# Patient Record
Sex: FEMALE | Race: WHITE | NOT HISPANIC OR LATINO | URBAN - METROPOLITAN AREA
[De-identification: names, ages, dates, MRNs, and addresses within clinical notes are randomized per-mention and may not be internally consistent; named-entity substitution may affect disease eponyms.]

---
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
## 2020-01-10 ENCOUNTER — MOBILE ON CALL (OUTPATIENT)
Age: 20
End: 2020-01-10

## 2020-01-13 ENCOUNTER — OUTPATIENT (OUTPATIENT)
Dept: OUTPATIENT SERVICES | Facility: HOSPITAL | Age: 20
LOS: 1 days | End: 2020-01-13
Payer: COMMERCIAL

## 2020-01-13 ENCOUNTER — APPOINTMENT (OUTPATIENT)
Dept: ULTRASOUND IMAGING | Facility: CLINIC | Age: 20
End: 2020-01-13
Payer: COMMERCIAL

## 2020-01-13 ENCOUNTER — RESULT REVIEW (OUTPATIENT)
Age: 20
End: 2020-01-13

## 2020-01-13 DIAGNOSIS — R92.8 OTHER ABNORMAL AND INCONCLUSIVE FINDINGS ON DIAGNOSTIC IMAGING OF BREAST: ICD-10-CM

## 2020-01-13 DIAGNOSIS — Z98.890 OTHER SPECIFIED POSTPROCEDURAL STATES: Chronic | ICD-10-CM

## 2020-01-13 DIAGNOSIS — Z00.8 ENCOUNTER FOR OTHER GENERAL EXAMINATION: ICD-10-CM

## 2020-01-13 PROCEDURE — 19084 BX BREAST ADD LESION US IMAG: CPT | Mod: RT

## 2020-01-13 PROCEDURE — 88305 TISSUE EXAM BY PATHOLOGIST: CPT

## 2020-01-13 PROCEDURE — 88305 TISSUE EXAM BY PATHOLOGIST: CPT | Mod: 26

## 2020-01-13 PROCEDURE — 19083 BX BREAST 1ST LESION US IMAG: CPT | Mod: LT

## 2020-01-13 PROCEDURE — 19083 BX BREAST 1ST LESION US IMAG: CPT

## 2020-01-22 ENCOUNTER — APPOINTMENT (OUTPATIENT)
Dept: CARDIOLOGY | Facility: CLINIC | Age: 20
End: 2020-01-22

## 2020-02-25 ENCOUNTER — APPOINTMENT (OUTPATIENT)
Dept: SURGERY | Facility: CLINIC | Age: 20
End: 2020-02-25
Payer: COMMERCIAL

## 2020-02-25 VITALS
DIASTOLIC BLOOD PRESSURE: 70 MMHG | SYSTOLIC BLOOD PRESSURE: 109 MMHG | WEIGHT: 166 LBS | TEMPERATURE: 98.3 F | BODY MASS INDEX: 24.59 KG/M2 | HEIGHT: 69 IN

## 2020-02-25 PROCEDURE — 99214 OFFICE O/P EST MOD 30 MIN: CPT

## 2020-02-26 NOTE — HISTORY OF PRESENT ILLNESS
[FreeTextEntry1] : I have the pleasure of seeing Sophie Hanson in the office for a followup visit to discuss bilateral breast core biopsies.  She presents with her grandmother.\par \par She is a jesus manuel 20 yo healthy female.  She felt a palpable right breast lump in mid July 2018 and saw her physician who recommended an ultrasound which then led to US guide core biopsy demonstrating a 2.8 cm Fibroadenoma.  She then felt the mass enlarged in December 2018 and underwent ultrasound.  Ultrasound demonstrated the fibroadenoma as 4.7 x 3.3 cm in size which is an increase in size from August 2018 US. She is s/p right breast excisional biopsy 1/9/2019 secondary 4.7 cm right breast biopsied as fibroadenoma without atypia. \par \par She started menses at age 15.\par She denies personal history of malignancy.\par Family history is significant for Mgreat grandmother diagnosed with breast cancer at age >50 and maternal great aunt diagnosed with breast cancer at age 80, and grandfather diagnosed with lung cancer at age 72.\par \par Imaging:  [facility]\par 6/10/2019  Breast ultrasound left complete:  Left breast palpable lump has the appearance of a fibroadenoma.  Lesion is probably benign.  Recommendation A six month left breast ultrasound follow up is advised to document stability.  US guided core biopsy can be performed in out is clinically indicated.  BIRADS 3 Probably benign.\par \par 12/27/2019  Bilateral breast ultrasound\par Impression: Marked increased in size in previously palpable hypoechoic mass 9:00 left breast 10 cm from the nipple.  Ultrasound core biopsy is warranted at this time in further evaluation.  If benign, the remainder of findings can be followed in 6 months time to document stability.  [id number]\par \par 1/13/2020  Surigal pathology\par 1.  Breast left 9:00 10 cm from nipple, ultrasound guided core biopsy:  Fibroadenoma\par 2.  Breast right 10:00 7 cm from nipple, ultrasound guided core biopsy;  Fibroadenoma.\par \par Pathology:\par 1/9/2019  Final surgical pathology\par 1.  Mass, right breast, wide excision:  Fibroadenoma (5.5cm ).  No atypia or malignancy seen.  Lesion focally extends to inked peripheral margin (anterior, medial inferior).\par 2.  Additional tissue, right breast, excision:  Mild fibrocystic changes (Fibrous with mild pseudo angiomatous stromal hyperplasia, mild columnar cell change).  Negative for atypia or malignancy.\par \par We reviewed and discussed biopsy results.  She understands both the right and left breast biopsy demonstrated fibroadenomas.  The right fibroadenoma measures 4 cm and the left breast fibroadenoma measures 1.3 cm.  We discussed surgical management and she will proceed with left breast kasey  localized lumpectomy and right breast kasey  excisional biopsy.

## 2020-02-26 NOTE — ASSESSMENT
[FreeTextEntry1] : 18 yo premenopausal female presents for bilateral breast biopsies demonstrating fibroadenomas.  She has a history of right breast 12:30 kasey  excisional biopsy of a 5.5 cm fibroadenoma.  Right breast fibroadenoma on most recent ultrasound measures 1.3 cm and left breast fibroadenoma measures 4 cm.  Plan for left breast kasey  localized lumpectomy and right breast kasey  excisional biopsy.\par

## 2020-02-26 NOTE — PHYSICAL EXAM
[Normocephalic] : normocephalic [Atraumatic] : atraumatic [EOMI] : extra ocular movement intact [PERRL] : pupils equal, round and reactive to light [Sclera nonicteric] : sclera nonicteric [No Supraclavicular Adenopathy] : no supraclavicular adenopathy [Supple] : supple [Examined in the supine and seated position] : examined in the supine and seated position [No dominant masses] : no dominant masses in right breast  [No dominant masses] : no dominant masses left breast [No Nipple Discharge] : no right nipple discharge [No Nipple Retraction] : no left nipple retraction [No Axillary Lymphadenopathy] : no left axillary lymphadenopathy [No Edema] : no edema [No Rashes] : no rashes [No Ulceration] : no ulceration [Breast Nipple Inversion] : nipples not inverted [Breast Nipple Retraction] : nipples not retracted [Breast Nipple Flattening] : nipples not flattened [Breast Nipple Fissures] : nipples not fissured [Breast Abnormal Lactation (Galactorrhea)] : no galactorrhea [Breast Abnormal Secretion Bloody Fluid] : no bloody discharge [Breast Abnormal Secretion Serous Fluid] : no serous discharge [Breast Abnormal Secretion Opalescent Fluid] : no milky discharge [de-identified] : No supraclavicular or axillary adenopathy. No dominant masses, normal to palpation. Everted nipple without discharge. No skin changes.\par \par \par \par  [de-identified] : No supraclavicular or axillary adenopathy. 4 cm mass in the left 8-9:00 position which is freely mobile. Everted nipple without discharge. No skin changes.

## 2020-04-08 PROBLEM — R92.8 ABNORMAL FINDING ON BREAST IMAGING: Status: ACTIVE | Noted: 2019-12-16

## 2020-04-08 NOTE — HISTORY OF PRESENT ILLNESS
[FreeTextEntry1] : I have the pleasure of seeing Sophie Hanson in the office for a follow up visit s/p right breast excisional biopsy 1/9/2019 secondary 4.7 cm right breast biopsied as fibroadenoma without atypia. \par \par She is a jesus manuel 19 yo healthy female.  She felt a palpable right breast lump in mid July and saw her physician who recommended an ultrasound which then led to US guide core biopsy demonstrating a 2.8 cm Fibroadenoma.  She then felt the mass enlarged in December and underwent ultrasound.  Ultrasound demonstrated the fibroadenoma as 4.7 x 3.3 cm in size which is an increase in size from August 2018 US. \par \par She started menses at age 15.\par She denies personal history of malignancy.\par Family history is significant for Mgreat grandmother diagnosed with breast cancer at age >50 and maternal great aunt diagnosed with breast cancer at age 80, and grandfather diagnosed with lung cancer at age 72.\par \par Pathology:\par 1/9/2019  Final surgical pathology\par 1.  Mass, right breast, wide excision:  Fibroadenoma (5.5cm ).  No atypia or malignancy seen.  Lesion focally extends to inked peripheral margin (anterior, medial inferior).\par 2.  Additional tissue, right breast, excision:  Mild fibrocystic changes (Fibrous with mild pseudo angiomatous stromal hyperplasia, mild columnar cell change).  Negative for atypia or malignancy.\par \par We reviewed and discussed clinical breast exam.  She reports left breast tenderness for the past 1-2 months.  There is a palpable 2 cm nodule in the left 9:00 area , which is highly suspicious for a fibroadenoma.  Recommendation for left breast ultrasound at this time and will discuss further testing if needed depending on ultrasound results.   All questions answered.

## 2020-04-08 NOTE — PHYSICAL EXAM
[Normocephalic] : normocephalic [Atraumatic] : atraumatic [EOMI] : extra ocular movement intact [PERRL] : pupils equal, round and reactive to light [Sclera nonicteric] : sclera nonicteric [Supple] : supple [No Supraclavicular Adenopathy] : no supraclavicular adenopathy [Examined in the supine and seated position] : examined in the supine and seated position [No dominant masses] : no dominant masses in right breast  [No dominant masses] : no dominant masses left breast [No Nipple Retraction] : no left nipple retraction [No Nipple Discharge] : no left nipple discharge [Breast Nipple Inversion] : nipples not inverted [Breast Nipple Retraction] : nipples not retracted [Breast Nipple Flattening] : nipples not flattened [Breast Nipple Fissures] : nipples not fissured [Breast Abnormal Lactation (Galactorrhea)] : no galactorrhea [Breast Abnormal Secretion Bloody Fluid] : no bloody discharge [Breast Abnormal Secretion Serous Fluid] : no serous discharge [Breast Abnormal Secretion Opalescent Fluid] : no milky discharge [No Axillary Lymphadenopathy] : no left axillary lymphadenopathy [No Edema] : no edema [No Rashes] : no rashes [No Ulceration] : no ulceration [de-identified] : No supraclavicular or axillary adenopathy. No dominant masses, normal to palpation. Everted nipple without discharge. No skin changes.\par \par  [de-identified] : No supraclavicular or axillary adenopathy. 2 cm nodule left 9:00. Everted nipple without discharge. No skin changes.

## 2020-04-08 NOTE — ASSESSMENT
[FreeTextEntry1] : 18 yo premenopausal female s/p right breast excisional biopsy for a 5.5 cm biopsied proven Fibroadenoma in the right breast 12:30.   She reports left breast tenderness for the past 1-2 months.  There is a palpable 2 cm nodule in the left 9:00 area , which is highly suspicious for a fibroadenoma.  Recommendation for left breast ultrasound at this time and will discuss further testing if needed depending on ultrasound results.\par 1.  Left breast ultrasound now and will call with results for further recommendation depending on ultrasound results.\par

## 2020-06-03 ENCOUNTER — NON-APPOINTMENT (OUTPATIENT)
Age: 20
End: 2020-06-03

## 2020-06-03 ENCOUNTER — APPOINTMENT (OUTPATIENT)
Dept: CARDIOLOGY | Facility: CLINIC | Age: 20
End: 2020-06-03
Payer: COMMERCIAL

## 2020-06-03 VITALS
OXYGEN SATURATION: 98 % | DIASTOLIC BLOOD PRESSURE: 69 MMHG | HEART RATE: 96 BPM | SYSTOLIC BLOOD PRESSURE: 112 MMHG | WEIGHT: 156 LBS

## 2020-06-03 VITALS — SYSTOLIC BLOOD PRESSURE: 105 MMHG | DIASTOLIC BLOOD PRESSURE: 71 MMHG

## 2020-06-03 PROCEDURE — 99242 OFF/OP CONSLTJ NEW/EST SF 20: CPT | Mod: 25

## 2020-06-03 PROCEDURE — 93000 ELECTROCARDIOGRAM COMPLETE: CPT

## 2020-06-03 RX ORDER — NITROFURANTOIN (MONOHYDRATE/MACROCRYSTALS) 25; 75 MG/1; MG/1
100 CAPSULE ORAL
Qty: 14 | Refills: 0 | Status: DISCONTINUED | COMMUNITY
Start: 2019-11-09 | End: 2020-06-03

## 2020-06-03 RX ORDER — FEXOFENADINE HCL 60 MG
CAPSULE ORAL
Refills: 0 | Status: ACTIVE | COMMUNITY

## 2020-06-03 NOTE — ASSESSMENT
[FreeTextEntry1] : ECG performed today at the office revealed a NSR 88x' with normal AQRS, WY, QRS and QTc. Inverted T wave in the inferior leads. \par

## 2020-06-03 NOTE — REASON FOR VISIT
[Initial Evaluation] : an initial evaluation of [Family Member] : family member [FreeTextEntry1] : Eval for family Hx of HCM

## 2020-06-03 NOTE — DISCUSSION/SUMMARY
[FreeTextEntry1] : Ms. BRIDGETT LOPEZ is a 19 year female with a strong family history of HCM (mother  of HCM/CHF). being assessed xiomara the first time to R/O the presence of HCM (as well as her twin sister). She is completely asymptomatic, but has palpitations that she attributes to anxiety and had 4 syncopal events during competitive racing. Furthermore, the ECG has T wave inversion in the inferior leads.\par At the present time she is completely asymptomatic.\par Will not start anxiety meds yet\par We will perform an echocardiogram to assess for LVH, left ventricular and valvular function.\par Will perform a Holter 48 hours to R/O arrhythmia\par Routine follow up in 1 months\par

## 2020-06-03 NOTE — PHYSICAL EXAM
[General Appearance - Well Developed] : well developed [Normal Appearance] : normal appearance [Well Groomed] : well groomed [General Appearance - Well Nourished] : well nourished [General Appearance - In No Acute Distress] : no acute distress [No Deformities] : no deformities [Normal Conjunctiva] : the conjunctiva exhibited no abnormalities [Normal Jugular Venous V Waves Present] : normal jugular venous V waves present [Heart Rate And Rhythm] : heart rate and rhythm were normal [Heart Sounds] : normal S1 and S2 [Murmurs] : no murmurs present [Arterial Pulses Normal] : the arterial pulses were normal [Edema] : no peripheral edema present [Veins - Varicosity Changes] : no varicosital changes were noted in the lower extremities [Respiration, Rhythm And Depth] : normal respiratory rhythm and effort [Exaggerated Use Of Accessory Muscles For Inspiration] : no accessory muscle use [Auscultation Breath Sounds / Voice Sounds] : lungs were clear to auscultation bilaterally [Chest Palpation] : palpation of the chest revealed no abnormalities [Lungs Percussion] : the lungs were normal to percussion [Bowel Sounds] : normal bowel sounds [Abdomen Soft] : soft [Abdomen Tenderness] : non-tender [Abdomen Mass (___ Cm)] : no abdominal mass palpated [Abdomen Hernia] : no hernia was discovered [Abnormal Walk] : normal gait [Nail Clubbing] : no clubbing of the fingernails [Cyanosis, Localized] : no localized cyanosis [Skin Color & Pigmentation] : normal skin color and pigmentation [Skin Turgor] : normal skin turgor [] : no rash [Oriented To Time, Place, And Person] : oriented to person, place, and time [Impaired Insight] : insight and judgment were intact [No Anxiety] : not feeling anxious [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

## 2020-06-03 NOTE — HISTORY OF PRESENT ILLNESS
[FreeTextEntry1] : 20 yo, single, no children. Sister (non-identical twin) of Sudhir Hanson fernandez family history of HCM. Mother  in 2019 with end stage CHF at age 52 with known HCM, PPM (ICD) and end stage heart failure. Didnt have genetic studies done and was under the care of Dr. Sumner.\par Has anxiety disorder not on medications (yet). Anxiety attacks are associated with palpitations, fast, regular, last 2-3 minutes. Slow to start and also slow to stop. \par Had four syncopal events after running, associated with dizziness. Recovered immediately. No seizures. \par Had PNA at age 10.\par She doesn’t do competitive running anymore. \par Rt breast biopsy and lumpectomy (fibroadenoma without atypia)\par At the present time patient is completely asymptomatic and denies CP, SOB, orthopnea or PND. Denies palpitations, dizziness or ankle swelling.\par Doesnt smoke\par Doesnt drink alcohol\par Denies the use of illicit drugs\par \par

## 2020-06-17 ENCOUNTER — APPOINTMENT (OUTPATIENT)
Dept: CARDIOLOGY | Facility: CLINIC | Age: 20
End: 2020-06-17
Payer: COMMERCIAL

## 2020-06-17 PROCEDURE — 93306 TTE W/DOPPLER COMPLETE: CPT

## 2020-07-15 ENCOUNTER — OUTPATIENT (OUTPATIENT)
Dept: OUTPATIENT SERVICES | Facility: HOSPITAL | Age: 20
LOS: 1 days | End: 2020-07-15
Payer: COMMERCIAL

## 2020-07-15 ENCOUNTER — RESULT REVIEW (OUTPATIENT)
Age: 20
End: 2020-07-15

## 2020-07-15 VITALS
TEMPERATURE: 98 F | DIASTOLIC BLOOD PRESSURE: 63 MMHG | OXYGEN SATURATION: 99 % | HEIGHT: 69 IN | RESPIRATION RATE: 16 BRPM | SYSTOLIC BLOOD PRESSURE: 136 MMHG | HEART RATE: 107 BPM | WEIGHT: 158.07 LBS

## 2020-07-15 DIAGNOSIS — Z98.890 OTHER SPECIFIED POSTPROCEDURAL STATES: Chronic | ICD-10-CM

## 2020-07-15 DIAGNOSIS — N63.32 UNSPECIFIED LUMP IN AXILLARY TAIL OF THE LEFT BREAST: ICD-10-CM

## 2020-07-15 DIAGNOSIS — Z01.818 ENCOUNTER FOR OTHER PREPROCEDURAL EXAMINATION: ICD-10-CM

## 2020-07-15 DIAGNOSIS — D24.1 BENIGN NEOPLASM OF RIGHT BREAST: ICD-10-CM

## 2020-07-15 LAB
HCG SERPL-ACNC: <1 MIU/ML — SIGNIFICANT CHANGE UP
HCT VFR BLD CALC: 39.2 % — SIGNIFICANT CHANGE UP (ref 34.5–45)
HGB BLD-MCNC: 12.7 G/DL — SIGNIFICANT CHANGE UP (ref 11.5–15.5)
MCHC RBC-ENTMCNC: 28 PG — SIGNIFICANT CHANGE UP (ref 27–34)
MCHC RBC-ENTMCNC: 32.4 GM/DL — SIGNIFICANT CHANGE UP (ref 32–36)
MCV RBC AUTO: 86.5 FL — SIGNIFICANT CHANGE UP (ref 80–100)
NRBC # BLD: 0 /100 WBCS — SIGNIFICANT CHANGE UP (ref 0–0)
PLATELET # BLD AUTO: 400 K/UL — SIGNIFICANT CHANGE UP (ref 150–400)
RBC # BLD: 4.53 M/UL — SIGNIFICANT CHANGE UP (ref 3.8–5.2)
RBC # FLD: 12.5 % — SIGNIFICANT CHANGE UP (ref 10.3–14.5)
WBC # BLD: 8.05 K/UL — SIGNIFICANT CHANGE UP (ref 3.8–10.5)
WBC # FLD AUTO: 8.05 K/UL — SIGNIFICANT CHANGE UP (ref 3.8–10.5)

## 2020-07-15 PROCEDURE — 84702 CHORIONIC GONADOTROPIN TEST: CPT

## 2020-07-15 PROCEDURE — 36415 COLL VENOUS BLD VENIPUNCTURE: CPT

## 2020-07-15 PROCEDURE — G0463: CPT

## 2020-07-15 PROCEDURE — 85027 COMPLETE CBC AUTOMATED: CPT

## 2020-07-15 RX ORDER — FEXOFENADINE HCL 30 MG
1 TABLET ORAL
Qty: 0 | Refills: 0 | DISCHARGE

## 2020-07-15 RX ORDER — DESOGESTREL AND ETHINYL ESTRADIOL 0.15-0.03
1 KIT ORAL
Qty: 0 | Refills: 0 | DISCHARGE

## 2020-07-15 NOTE — H&P PST ADULT - NSICDXPROBLEM_GEN_ALL_CORE_FT
PROBLEM DIAGNOSES  Problem: Unspecified lump in axillary tail of the left breast  Assessment and Plan:     Problem: Benign neoplasm of right breast  Assessment and Plan:

## 2020-07-15 NOTE — H&P PST ADULT - NSICDXFAMILYHX_GEN_ALL_CORE_FT
FAMILY HISTORY:  Father  Still living? Yes, Estimated age: 51-60  Family history of melanoma, Age at diagnosis: 51-60    Mother  Still living? Yes, Estimated age: 51-60  Family history of ITP, Age at diagnosis: 21-30  Family history of restrictive cardiomyopathy, Age at diagnosis: Age Unknown  Family history of stroke, Age at diagnosis: 21-30  Family history of systemic lupus erythematosus (SLE) in mother, Age at diagnosis: Age Unknown

## 2020-07-15 NOTE — H&P PST ADULT - ENT GEN HX ROS MEA POS PC
"  Adult Mental Health Outpatient Group Therapy Progress Note     Client Initial Individualized Goals for Treatment: \"I just want to address this cycle and break it up and try to find some coping mechanisms that actually work\".     See Initial Treatment suggestions for the client during the time between Diagnostic Assessment and completion of the Master Individualized Treatment Plan.     Treatment Goals:  Follow Safety Plan   Ask for more information, support and/or assistance as needed.  Follow up with providers/community supports as needed: Interested in psychiatry.  Interested in individual therapy.  Report increases or changes in symptoms to staff.  Report any personal safety concerns to staff.   Take medications as prescribed.  Report medication changes and/or side effects to staff.  Attend and participate in groups as scheduled or notify staff if unable to do so.  Report any use of substances to staff as this may impact your symptoms and/or personal safety.  Notify staff if you have any other issues that need to be addressed. This may include any current abuse / neglect / exploitation or other vulnerability.  Follow recommendations of your treatment team and discuss concerns if not in agreement.     Area of Treatment Focus:  Symptom Management    Therapeutic Interventions/Treatment Strategies:  Support, Feedback, Structured Activity, Clarification and Education    Response to Treatment Strategies:  Accepted Feedback, Gave Feedback, Listened, Focused on Goals, Attentive, Accepted Support and Alert    Name of Group:  Mental health management, 200-250, 3 participants     Description and Outcome:  The group was given a structured journaling worksheet with the focus on feeling identification, expression and containment.  Information on the purpose and benefits of structured journaling was discussed.  Group members were offered the opportunity to share part, all, or none of their journaling and were encouraged to " comment on process.  Rodo chose to work with feeling of apathy.  He verbalized understanding of structure and contributed to discussion.    Is this a Weekly Review of the Progress on the Treatment Plan?  No         sinus symptoms

## 2020-07-15 NOTE — H&P PST ADULT - HISTORY OF PRESENT ILLNESS
This   presents to PST for a scheduled   with Dr moore  . This 19 yo female with no significant PMHX   presents to PST for a scheduled left breast Gerri  localized lumpectomy right breast gerri  excisional biopsy  with Dr Cruz  on 7/20/20 . She has a history of b/l breast masses which are being monitored but "there is another one now so they want to take a better look"

## 2020-07-15 NOTE — H&P PST ADULT - ASSESSMENT
This 21 yo female with no significant PMHX   presents to PST for a scheduled left breast Gerri  localized lumpectomy right breast gerri  excisional biopsy  with Dr Cruz   on 7/20/20 .

## 2020-07-16 ENCOUNTER — RESULT REVIEW (OUTPATIENT)
Age: 20
End: 2020-07-16

## 2020-07-16 ENCOUNTER — OUTPATIENT (OUTPATIENT)
Dept: OUTPATIENT SERVICES | Facility: HOSPITAL | Age: 20
LOS: 1 days | End: 2020-07-16
Payer: COMMERCIAL

## 2020-07-16 ENCOUNTER — APPOINTMENT (OUTPATIENT)
Dept: ULTRASOUND IMAGING | Facility: CLINIC | Age: 20
End: 2020-07-16
Payer: COMMERCIAL

## 2020-07-16 DIAGNOSIS — Z98.890 OTHER SPECIFIED POSTPROCEDURAL STATES: Chronic | ICD-10-CM

## 2020-07-16 DIAGNOSIS — N64.89 OTHER SPECIFIED DISORDERS OF BREAST: ICD-10-CM

## 2020-07-16 DIAGNOSIS — Z01.818 ENCOUNTER FOR OTHER PREPROCEDURAL EXAMINATION: ICD-10-CM

## 2020-07-16 PROCEDURE — 19285 PERQ DEV BREAST 1ST US IMAG: CPT

## 2020-07-16 PROCEDURE — 19285 PERQ DEV BREAST 1ST US IMAG: CPT | Mod: LT

## 2020-07-16 PROCEDURE — C1739: CPT

## 2020-07-16 PROCEDURE — 19285 PERQ DEV BREAST 1ST US IMAG: CPT | Mod: RT

## 2020-07-19 ENCOUNTER — APPOINTMENT (OUTPATIENT)
Dept: DISASTER EMERGENCY | Facility: CLINIC | Age: 20
End: 2020-07-19

## 2020-07-20 LAB — SARS-COV-2 N GENE NPH QL NAA+PROBE: NOT DETECTED

## 2020-07-21 ENCOUNTER — TRANSCRIPTION ENCOUNTER (OUTPATIENT)
Age: 20
End: 2020-07-21

## 2020-07-22 ENCOUNTER — RESULT REVIEW (OUTPATIENT)
Age: 20
End: 2020-07-22

## 2020-07-22 ENCOUNTER — APPOINTMENT (OUTPATIENT)
Dept: SURGERY | Facility: HOSPITAL | Age: 20
End: 2020-07-22

## 2020-07-22 ENCOUNTER — OUTPATIENT (OUTPATIENT)
Dept: OUTPATIENT SERVICES | Facility: HOSPITAL | Age: 20
LOS: 1 days | End: 2020-07-22
Payer: COMMERCIAL

## 2020-07-22 VITALS
HEIGHT: 69 IN | SYSTOLIC BLOOD PRESSURE: 117 MMHG | WEIGHT: 158.07 LBS | DIASTOLIC BLOOD PRESSURE: 75 MMHG | TEMPERATURE: 99 F | RESPIRATION RATE: 14 BRPM | OXYGEN SATURATION: 100 % | HEART RATE: 100 BPM

## 2020-07-22 VITALS — DIASTOLIC BLOOD PRESSURE: 66 MMHG | HEART RATE: 85 BPM | SYSTOLIC BLOOD PRESSURE: 119 MMHG | OXYGEN SATURATION: 100 %

## 2020-07-22 DIAGNOSIS — Z98.890 OTHER SPECIFIED POSTPROCEDURAL STATES: Chronic | ICD-10-CM

## 2020-07-22 DIAGNOSIS — D24.1 BENIGN NEOPLASM OF RIGHT BREAST: ICD-10-CM

## 2020-07-22 DIAGNOSIS — N63.32 UNSPECIFIED LUMP IN AXILLARY TAIL OF THE LEFT BREAST: ICD-10-CM

## 2020-07-22 PROCEDURE — 76098 X-RAY EXAM SURGICAL SPECIMEN: CPT | Mod: 26

## 2020-07-22 PROCEDURE — 19301 PARTIAL MASTECTOMY: CPT | Mod: LT

## 2020-07-22 PROCEDURE — 19125 EXCISION BREAST LESION: CPT | Mod: 59,RT

## 2020-07-22 PROCEDURE — 76098 X-RAY EXAM SURGICAL SPECIMEN: CPT

## 2020-07-22 PROCEDURE — 88307 TISSUE EXAM BY PATHOLOGIST: CPT

## 2020-07-22 PROCEDURE — 19125 EXCISION BREAST LESION: CPT | Mod: 50

## 2020-07-22 PROCEDURE — 76098 X-RAY EXAM SURGICAL SPECIMEN: CPT | Mod: 26,77

## 2020-07-22 PROCEDURE — 88307 TISSUE EXAM BY PATHOLOGIST: CPT | Mod: 26

## 2020-07-22 RX ORDER — OXYCODONE HYDROCHLORIDE 5 MG/1
5 TABLET ORAL ONCE
Refills: 0 | Status: DISCONTINUED | OUTPATIENT
Start: 2020-07-22 | End: 2020-07-22

## 2020-07-22 RX ORDER — HYDROMORPHONE HYDROCHLORIDE 2 MG/ML
0.5 INJECTION INTRAMUSCULAR; INTRAVENOUS; SUBCUTANEOUS
Refills: 0 | Status: DISCONTINUED | OUTPATIENT
Start: 2020-07-22 | End: 2020-07-22

## 2020-07-22 RX ORDER — CEFAZOLIN SODIUM 1 G
2000 VIAL (EA) INJECTION ONCE
Refills: 0 | Status: COMPLETED | OUTPATIENT
Start: 2020-07-22 | End: 2020-07-22

## 2020-07-22 RX ORDER — ONDANSETRON 8 MG/1
4 TABLET, FILM COATED ORAL ONCE
Refills: 0 | Status: DISCONTINUED | OUTPATIENT
Start: 2020-07-22 | End: 2020-07-22

## 2020-07-22 RX ORDER — SODIUM CHLORIDE 9 MG/ML
1000 INJECTION, SOLUTION INTRAVENOUS
Refills: 0 | Status: DISCONTINUED | OUTPATIENT
Start: 2020-07-22 | End: 2020-07-22

## 2020-07-22 RX ADMIN — SODIUM CHLORIDE 50 MILLILITER(S): 9 INJECTION, SOLUTION INTRAVENOUS at 10:10

## 2020-07-22 RX ADMIN — OXYCODONE HYDROCHLORIDE 5 MILLIGRAM(S): 5 TABLET ORAL at 14:11

## 2020-07-22 RX ADMIN — SODIUM CHLORIDE 75 MILLILITER(S): 9 INJECTION, SOLUTION INTRAVENOUS at 13:40

## 2020-07-22 RX ADMIN — OXYCODONE HYDROCHLORIDE 5 MILLIGRAM(S): 5 TABLET ORAL at 15:11

## 2020-07-22 RX ADMIN — HYDROMORPHONE HYDROCHLORIDE 0.5 MILLIGRAM(S): 2 INJECTION INTRAMUSCULAR; INTRAVENOUS; SUBCUTANEOUS at 13:48

## 2020-07-22 RX ADMIN — HYDROMORPHONE HYDROCHLORIDE 0.5 MILLIGRAM(S): 2 INJECTION INTRAMUSCULAR; INTRAVENOUS; SUBCUTANEOUS at 13:58

## 2020-07-22 NOTE — ASU DISCHARGE PLAN (ADULT/PEDIATRIC) - ASU DC SPECIAL INSTRUCTIONSFT
Leave steri strips intact   May remove dressing in am  May shower in 24 hours  No reaching, pulling, pushing, lifting with right hand, left hand  No strenuous activity  No hot tubs, pools, baths.

## 2020-07-22 NOTE — ASU DISCHARGE PLAN (ADULT/PEDIATRIC) - CALL YOUR DOCTOR IF YOU HAVE ANY OF THE FOLLOWING:
Numbness, tingling, color or temperature change to extremity/Fever greater than (need to indicate Fahrenheit or Celsius)/Wound/Surgical Site with redness, or foul smelling discharge or pus/Swelling that gets worse/Pain not relieved by Medications/Bleeding that does not stop

## 2020-07-22 NOTE — BRIEF OPERATIVE NOTE - NSICDXBRIEFPOSTOP_GEN_ALL_CORE_FT
POST-OP DIAGNOSIS:  Benign neoplasm of right breast 22-Jul-2020 13:41:51  Rosemarie Collier  Left breast mass 22-Jul-2020 13:41:44  Rosemarie Collier

## 2020-07-22 NOTE — ASU DISCHARGE PLAN (ADULT/PEDIATRIC) - CARE PROVIDER_API CALL
Suha Cruz  SURGERY  [address] [place] [id number]  Phone: [phone]  Fax: [phone]  Follow Up Time: Suha Cruz  SURGERY  [address] [place] [id number]  Phone: [phone]  Fax: [phone]  Follow Up Time: 2 weeks

## 2020-07-22 NOTE — BRIEF OPERATIVE NOTE - OPERATION/FINDINGS
Left breast mass, palpable.  Localization with kasey .  Excision biopsy of right breast, utilizing kasey  for localization

## 2020-07-22 NOTE — BRIEF OPERATIVE NOTE - NSICDXBRIEFPROCEDURE_GEN_ALL_CORE_FT
PROCEDURES:  Left breast lumpectomy 22-Jul-2020 13:40:27 kasey  localization. Rosemarie Collier  Biopsy, breast, with radar localization using KASEY  22-Jul-2020 13:37:26 excisional biopsy, right breast Rosemarie Collier

## 2020-07-22 NOTE — BRIEF OPERATIVE NOTE - NSICDXBRIEFPREOP_GEN_ALL_CORE_FT
PRE-OP DIAGNOSIS:  Left breast mass 22-Jul-2020 13:41:37  Rosemarie Collier  Benign neoplasm of right breast 22-Jul-2020 13:40:51  Rosemarie Collier

## 2020-08-06 ENCOUNTER — APPOINTMENT (OUTPATIENT)
Dept: SURGERY | Facility: CLINIC | Age: 20
End: 2020-08-06

## 2020-08-06 VITALS
HEART RATE: 106 BPM | WEIGHT: 160 LBS | HEIGHT: 69 IN | OXYGEN SATURATION: 98 % | BODY MASS INDEX: 23.7 KG/M2 | DIASTOLIC BLOOD PRESSURE: 77 MMHG | SYSTOLIC BLOOD PRESSURE: 120 MMHG

## 2020-08-17 ENCOUNTER — APPOINTMENT (OUTPATIENT)
Dept: OBGYN | Facility: CLINIC | Age: 20
End: 2020-08-17

## 2020-08-20 ENCOUNTER — APPOINTMENT (OUTPATIENT)
Dept: PEDIATRICS | Facility: CLINIC | Age: 20
End: 2020-08-20
Payer: COMMERCIAL

## 2020-08-20 ENCOUNTER — LABORATORY RESULT (OUTPATIENT)
Age: 20
End: 2020-08-20

## 2020-08-20 VITALS — WEIGHT: 159 LBS | OXYGEN SATURATION: 98 % | TEMPERATURE: 98.6 F | HEART RATE: 116 BPM

## 2020-08-20 DIAGNOSIS — J06.9 ACUTE UPPER RESPIRATORY INFECTION, UNSPECIFIED: ICD-10-CM

## 2020-08-20 PROCEDURE — 99213 OFFICE O/P EST LOW 20 MIN: CPT

## 2020-08-20 RX ORDER — OXYCODONE AND ACETAMINOPHEN 5; 325 MG/1; MG/1
5-325 TABLET ORAL
Qty: 25 | Refills: 0 | Status: DISCONTINUED | COMMUNITY
Start: 2020-07-22

## 2020-08-20 NOTE — HISTORY OF PRESENT ILLNESS
[FreeTextEntry6] : 20 years old is here for fever,low grade,cough and congestion\par has no sick contacts\par has been taking allergy medicine\par cough getting worse over past 2 days

## 2020-08-20 NOTE — DISCUSSION/SUMMARY
[FreeTextEntry1] : she has low grade fever with nasal congestion and cough\par most likely URI with allergies\par needs to do saline spray 3-4 times a day and take allegra-d once a day\par to come back if cough worse or fever very high\par will send out covid 19 test

## 2020-08-20 NOTE — PHYSICAL EXAM
[Clear TM bilaterally] : clear tympanic membranes bilaterally [Clear] : right tympanic membrane clear [Clear Rhinorrhea] : clear rhinorrhea [Nonerythematous Oropharynx] : nonerythematous oropharynx [Clear to Auscultation Bilaterally] : clear to auscultation bilaterally [NL] : warm [FreeTextEntry7] : no wheezing,rales or rhonchi heard

## 2020-08-31 ENCOUNTER — RX RENEWAL (OUTPATIENT)
Age: 20
End: 2020-08-31

## 2020-09-02 ENCOUNTER — NON-APPOINTMENT (OUTPATIENT)
Age: 20
End: 2020-09-02

## 2020-09-02 ENCOUNTER — APPOINTMENT (OUTPATIENT)
Dept: CARDIOLOGY | Facility: CLINIC | Age: 20
End: 2020-09-02
Payer: COMMERCIAL

## 2020-09-02 VITALS
SYSTOLIC BLOOD PRESSURE: 114 MMHG | HEIGHT: 69 IN | OXYGEN SATURATION: 99 % | TEMPERATURE: 98.3 F | DIASTOLIC BLOOD PRESSURE: 64 MMHG | HEART RATE: 101 BPM | WEIGHT: 160 LBS | BODY MASS INDEX: 23.7 KG/M2

## 2020-09-02 PROCEDURE — 99214 OFFICE O/P EST MOD 30 MIN: CPT | Mod: 24

## 2020-09-02 PROCEDURE — 93000 ELECTROCARDIOGRAM COMPLETE: CPT

## 2020-09-02 NOTE — REVIEW OF SYSTEMS
[Negative] : Endocrine [Palpitations] : palpitations [Shortness Of Breath] : no shortness of breath [Dyspnea on exertion] : not dyspnea during exertion [Chest  Pressure] : no chest pressure [Chest Pain] : no chest pain [Leg Claudication] : no intermittent leg claudication [Lower Ext Edema] : no extremity edema

## 2020-09-02 NOTE — DISCUSSION/SUMMARY
[FreeTextEntry1] : Ms. BRIDGETT LOPEZ is a 19 year female with a strong family history of HCM (mother  of HCM/CHF). being follow up for palpitations. TTE and Holter were WNL. No evidence of disease. Still C/O palpitations. Will teach how to use her iWatch to register ECG.\par Will not consider meds at this time. Might start BB in the future to control her symptoms.\par Routine follow up in 3 months\par

## 2020-09-02 NOTE — ASSESSMENT
[FreeTextEntry1] : ECG performed today at the office revealed a NSR 92 x' with normal AQRS, AR, QRS and QTc.

## 2020-09-02 NOTE — HISTORY OF PRESENT ILLNESS
[FreeTextEntry1] : 20 yo, single, no children. At [facility] [employer]. Sister (non-identical twin) of Sudhir Hanson, strong family history of HCM. Mother  in 2019 with end stage CHF at age 52 with known HCM, PPM (ICD) and end stage heart failure. Didn't have genetic studies done and was under the care of Dr. Sumner.\par Because of the strong family history, an TTE was done that was completely normal. \par Holter ECG di not reveal any arrhythmias (1 week). \par Today for routine follow up. Still has anxiety disorder not on medications (yet). Anxiety attacks are associated with palpitations, fast, regular, last 2-3 minutes. Slow to start and also slow to stop. Deneis CP or SOB. \par Had four syncopal events after running , associated with dizziness. Recovered immediately. No seizures. \par Had PNA at age 10.\par She doesn’t do competitive running anymore. \par Rt breast biopsy and lumpectomy (fibroadenoma without atypia)\par At the present time patient is completely asymptomatic and denies CP, SOB, orthopnea or PND. Denies palpitations, dizziness or ankle swelling.\par Doesnt smoke\par Doesnt drink alcohol\par Denies the use of illicit drugs\par \par

## 2020-09-02 NOTE — PHYSICAL EXAM
[General Appearance - Well Developed] : well developed [Normal Appearance] : normal appearance [Well Groomed] : well groomed [No Deformities] : no deformities [General Appearance - Well Nourished] : well nourished [General Appearance - In No Acute Distress] : no acute distress [Normal Conjunctiva] : the conjunctiva exhibited no abnormalities [Normal Jugular Venous V Waves Present] : normal jugular venous V waves present [Respiration, Rhythm And Depth] : normal respiratory rhythm and effort [Exaggerated Use Of Accessory Muscles For Inspiration] : no accessory muscle use [Auscultation Breath Sounds / Voice Sounds] : lungs were clear to auscultation bilaterally [Chest Palpation] : palpation of the chest revealed no abnormalities [Heart Rate And Rhythm] : heart rate and rhythm were normal [Lungs Percussion] : the lungs were normal to percussion [Heart Sounds] : normal S1 and S2 [Murmurs] : no murmurs present [Arterial Pulses Normal] : the arterial pulses were normal [Edema] : no peripheral edema present [Veins - Varicosity Changes] : no varicosital changes were noted in the lower extremities [Bowel Sounds] : normal bowel sounds [Abdomen Tenderness] : non-tender [Abdomen Soft] : soft [Abdomen Hernia] : no hernia was discovered [Abdomen Mass (___ Cm)] : no abdominal mass palpated [Abnormal Walk] : normal gait [Nail Clubbing] : no clubbing of the fingernails [Skin Color & Pigmentation] : normal skin color and pigmentation [Cyanosis, Localized] : no localized cyanosis [Skin Turgor] : normal skin turgor [] : no rash [Oriented To Time, Place, And Person] : oriented to person, place, and time [Impaired Insight] : insight and judgment were intact [No Anxiety] : not feeling anxious [FreeTextEntry1] : Deferred for COVID

## 2020-10-26 ENCOUNTER — APPOINTMENT (OUTPATIENT)
Dept: FAMILY MEDICINE | Facility: CLINIC | Age: 20
End: 2020-10-26
Payer: COMMERCIAL

## 2020-10-26 ENCOUNTER — NON-APPOINTMENT (OUTPATIENT)
Age: 20
End: 2020-10-26

## 2020-10-26 VITALS
HEIGHT: 69 IN | DIASTOLIC BLOOD PRESSURE: 72 MMHG | RESPIRATION RATE: 14 BRPM | HEART RATE: 91 BPM | BODY MASS INDEX: 25.03 KG/M2 | OXYGEN SATURATION: 99 % | TEMPERATURE: 97.2 F | SYSTOLIC BLOOD PRESSURE: 110 MMHG | WEIGHT: 169 LBS

## 2020-10-26 DIAGNOSIS — Z76.89 PERSONS ENCOUNTERING HEALTH SERVICES IN OTHER SPECIFIED CIRCUMSTANCES: ICD-10-CM

## 2020-10-26 DIAGNOSIS — F41.8 OTHER SPECIFIED ANXIETY DISORDERS: ICD-10-CM

## 2020-10-26 PROCEDURE — 99072 ADDL SUPL MATRL&STAF TM PHE: CPT

## 2020-10-26 PROCEDURE — 99203 OFFICE O/P NEW LOW 30 MIN: CPT | Mod: 25

## 2020-10-26 RX ORDER — ALPRAZOLAM 0.5 MG/1
0.5 TABLET ORAL
Qty: 4 | Refills: 0 | Status: DISCONTINUED | COMMUNITY
Start: 2020-01-10 | End: 2020-10-26

## 2020-10-26 RX ORDER — ALPRAZOLAM 0.25 MG/1
0.25 TABLET ORAL
Qty: 3 | Refills: 0 | Status: DISCONTINUED | COMMUNITY
Start: 2020-07-13 | End: 2020-10-26

## 2020-10-28 NOTE — HISTORY OF PRESENT ILLNESS
[FreeTextEntry1] : Pt is here to establish PCP. [de-identified] : 19 y/o F single, no children. At [facility] [employer]. Sister (non-identical twin) of Sudhir Hanson, strong family history of HCM. Mother  in 2019 with end stage CHF at age 52 with known HCM, PPM (ICD) and end stage heart failure. \par Pt was seen by cardiology, Dr Aguilar. had recent  TTE was done that was completely normal. Holter ECG di not reveal any arrhythmias \par She reports anxiety and depression. Feeling sad. She is under counseling regular and was advise to have Psychiatry eval.\par She states has Anxiety attacks associated with palpitations, fast, regular, last 2-3 minutes. . Deneis CP or SOB. \par She presents today to establish PCP and have guidance.\par Reports GI discomfort. Sometimes needs to go to bathroom immediately after eating, with explosive diarrhea. States has heartburn., easy satiety.with only 2 bites.States has aprox 1 episode of explosive diarrhea weekly for aprox 6 months ago.+ bloated.

## 2020-10-28 NOTE — HEALTH RISK ASSESSMENT
[FreeTextEntry1] : n/a [] : No [de-identified] : J [de-identified] : GYN, Cardiology, Cancer specialist, psychiatry [de-identified] : joyce [Audit-CScore] : 1 [de-identified] : n/a [de-identified] : Running, walking [de-identified] : Fast food, sandwiches [[id number]] : 4 [LowDoseCTScan] : denies [EyeExamDate] : 09/20 [Change in mental status noted] : No change in mental status noted [Language] : denies difficulty with language [Behavior] : denies difficulty with behavior [Learning/Retaining New Information] : denies difficulty learning/retaining new information [Handling Complex Tasks] : denies difficulty handling complex tasks [Reasoning] : denies difficulty with reasoning [Spatial Ability and Orientation] : denies difficulty with spatial ability and orientation [Reports changes in hearing] : Reports no changes in hearing [Reports changes in vision] : Reports no changes in vision [Reports changes in dental health] : Reports no changes in dental health [Travel to Developing Areas] : does not  travel to developing areas [TB Exposure] : is not being exposed to tuberculosis [Caregiver Concerns] : does not have caregiver concerns [MammogramDate] : n/a [MammogramComments] : denies [PapSmearDate] : 09/19 [BoneDensityDate] : n/a [ColonoscopyDate] : n/a [de-identified] : Father, sister and her partner [de-identified] : [facility] [AdvancecareDate] : 10/20

## 2020-10-28 NOTE — ASSESSMENT
[FreeTextEntry1] : Pt establishing a new PCP with Anxiety and depression for long time, GI symptoms, most likely IBS and episodes of palpitations.\par \par Pt refused to have blood tests done today\par refused flu shot.\par According to grandmother, pt needs to take Xanax every time before a procedure.\par Pt was advise to continue with regular Counseling.\par Psychiatry referral\par GI referral.\par F/up prn

## 2020-11-10 ENCOUNTER — APPOINTMENT (OUTPATIENT)
Dept: PSYCHIATRY | Facility: CLINIC | Age: 20
End: 2020-11-10

## 2020-11-21 ENCOUNTER — RX RENEWAL (OUTPATIENT)
Age: 20
End: 2020-11-21

## 2020-12-17 NOTE — ASU PATIENT PROFILE, ADULT - NS SC CAGE ALCOHOL ANNOYED YOU
normal appearance , without tenderness upon palpation , no deformities , trachea midline , Thyroid normal size , no thyroid nodules , no masses , no JVD , thyroid nontender
no

## 2020-12-21 ENCOUNTER — RX RENEWAL (OUTPATIENT)
Age: 20
End: 2020-12-21

## 2020-12-23 PROBLEM — J06.9 URI, ACUTE: Status: RESOLVED | Noted: 2020-08-20 | Resolved: 2020-12-23

## 2021-01-24 NOTE — ASU DISCHARGE PLAN (ADULT/PEDIATRIC) - PROVIDER TOKENS
Pt VSS except bp elevated. Pt denies any shortness of breath. Pt denies any chest pain. pt A&Ox4,, denies dizziness, SOB . PROVIDER:[TOKEN:[[id number]]] PROVIDER:[TOKEN:[[id number]],FOLLOWUP:[2 weeks]]

## 2021-02-10 ENCOUNTER — APPOINTMENT (OUTPATIENT)
Dept: CARDIOLOGY | Facility: CLINIC | Age: 21
End: 2021-02-10

## 2021-02-26 ENCOUNTER — APPOINTMENT (OUTPATIENT)
Dept: GASTROENTEROLOGY | Facility: CLINIC | Age: 21
End: 2021-02-26

## 2021-03-02 ENCOUNTER — APPOINTMENT (OUTPATIENT)
Dept: OBGYN | Facility: CLINIC | Age: 21
End: 2021-03-02

## 2021-04-02 ENCOUNTER — APPOINTMENT (OUTPATIENT)
Dept: OBGYN | Facility: CLINIC | Age: 21
End: 2021-04-02
Payer: COMMERCIAL

## 2021-04-02 VITALS
HEIGHT: 69 IN | HEART RATE: 98 BPM | SYSTOLIC BLOOD PRESSURE: 121 MMHG | WEIGHT: 162 LBS | DIASTOLIC BLOOD PRESSURE: 83 MMHG | BODY MASS INDEX: 23.99 KG/M2

## 2021-04-02 DIAGNOSIS — Z11.3 ENCOUNTER FOR SCREENING FOR INFECTIONS WITH A PREDOMINANTLY SEXUAL MODE OF TRANSMISSION: ICD-10-CM

## 2021-04-02 DIAGNOSIS — Z01.419 ENCOUNTER FOR GYNECOLOGICAL EXAMINATION (GENERAL) (ROUTINE) W/OUT ABNORMAL FINDINGS: ICD-10-CM

## 2021-04-02 PROCEDURE — 99072 ADDL SUPL MATRL&STAF TM PHE: CPT

## 2021-04-02 PROCEDURE — 99395 PREV VISIT EST AGE 18-39: CPT

## 2021-04-02 RX ORDER — DESOGESTREL AND ETHINYL ESTRADIOL 0.15-0.03
0.15-3 KIT ORAL
Qty: 3 | Refills: 3 | Status: ACTIVE | COMMUNITY
Start: 2019-01-14 | End: 1900-01-01

## 2021-04-02 NOTE — PHYSICAL EXAM
[Awake] : awake [Alert] : alert [Acute Distress] : no acute distress [Mass] : no breast mass [Nipple Discharge] : no nipple discharge [Axillary LAD] : no axillary lymphadenopathy [Soft] : soft [Tender] : non tender [Distended] : not distended [H/Smegaly] : no hepatosplenomegaly [Oriented x3] : oriented to person, place, and time [Depressed Mood] : not depressed [Flat Affect] : affect not flat [Uterine Adnexae] : were not tender and not enlarged

## 2021-04-28 ENCOUNTER — APPOINTMENT (OUTPATIENT)
Dept: GASTROENTEROLOGY | Facility: CLINIC | Age: 21
End: 2021-04-28
Payer: COMMERCIAL

## 2021-04-28 VITALS
SYSTOLIC BLOOD PRESSURE: 110 MMHG | DIASTOLIC BLOOD PRESSURE: 60 MMHG | RESPIRATION RATE: 12 BRPM | HEIGHT: 69 IN | OXYGEN SATURATION: 98 % | TEMPERATURE: 98 F | WEIGHT: 150 LBS | BODY MASS INDEX: 22.22 KG/M2

## 2021-04-28 DIAGNOSIS — J30.2 OTHER SEASONAL ALLERGIC RHINITIS: ICD-10-CM

## 2021-04-28 DIAGNOSIS — F41.9 ANXIETY DISORDER, UNSPECIFIED: ICD-10-CM

## 2021-04-28 DIAGNOSIS — N94.6 DYSMENORRHEA, UNSPECIFIED: ICD-10-CM

## 2021-04-28 DIAGNOSIS — K58.2 MIXED IRRITABLE BOWEL SYNDROME: ICD-10-CM

## 2021-04-28 DIAGNOSIS — D24.2 BENIGN NEOPLASM OF RIGHT BREAST: ICD-10-CM

## 2021-04-28 DIAGNOSIS — Z86.59 PERSONAL HISTORY OF OTHER MENTAL AND BEHAVIORAL DISORDERS: ICD-10-CM

## 2021-04-28 DIAGNOSIS — E73.9 LACTOSE INTOLERANCE, UNSPECIFIED: ICD-10-CM

## 2021-04-28 DIAGNOSIS — D24.1 BENIGN NEOPLASM OF RIGHT BREAST: ICD-10-CM

## 2021-04-28 PROCEDURE — 99072 ADDL SUPL MATRL&STAF TM PHE: CPT

## 2021-04-28 PROCEDURE — 99204 OFFICE O/P NEW MOD 45 MIN: CPT

## 2021-04-28 NOTE — ASSESSMENT
[FreeTextEntry1] : Patient symptoms are classic for irritable bowel syndrome with alternating diarrhea and constipation.  This is associated with a coexistent lactose intolerance.  I recommended that we rule out celiac disease as well as any thyroid disorder and Crohn's ileitis.  She will obtain a CBC, comprehensive metabolic profile as well as celiac antibodies and thyroid function tests and C-reactive protein.  She will obtain a CAT scan enterography as well.  In the meantime she will start Benefiber 1 heaping tablespoon to be taken at 6 to 8 ounces of fluid daily.  She will be seen again in 2 months for further follow-up.

## 2021-04-28 NOTE — PHYSICAL EXAM
[General Appearance - Well Nourished] : well nourished [General Appearance - Well Developed] : well developed [General Appearance - Well-Appearing] : healthy appearing [Sclera] : the sclera and conjunctiva were normal [PERRL With Normal Accommodation] : pupils were equal in size, round, and reactive to light [Extraocular Movements] : extraocular movements were intact [Outer Ear] : the ears and nose were normal in appearance [Hearing Threshold Finger Rub Not Cook] : hearing was normal [Examination Of The Oral Cavity] : the lips and gums were normal [Neck Appearance] : the appearance of the neck was normal [Auscultation Breath Sounds / Voice Sounds] : lungs were clear to auscultation bilaterally [Heart Rate And Rhythm] : heart rate was normal and rhythm regular [Heart Sounds] : normal S1 and S2 [Heart Sounds Gallop] : no gallops [Murmurs] : no murmurs [Heart Sounds Pericardial Friction Rub] : no pericardial rub [Bowel Sounds] : normal bowel sounds [Abdomen Soft] : soft [Abdomen Tenderness] : non-tender [Abdomen Mass (___ Cm)] : no abdominal mass palpated [Abnormal Walk] : normal gait [Nail Clubbing] : no clubbing  or cyanosis of the fingernails [Musculoskeletal - Swelling] : no joint swelling seen [Motor Tone] : muscle strength and tone were normal [Skin Color & Pigmentation] : normal skin color and pigmentation [Skin Turgor] : normal skin turgor [] : no rash [Motor Exam] : the motor exam was normal [No Focal Deficits] : no focal deficits [Oriented To Time, Place, And Person] : oriented to person, place, and time [Impaired Insight] : insight and judgment were intact [Affect] : the affect was normal

## 2021-04-28 NOTE — HISTORY OF PRESENT ILLNESS
[de-identified] : For the past 2 years the patient has been experiencing alternating diarrhea and constipation.  On days when she has diarrhea she will have 3-4 loose stools with abdominal gas and bloating as well as occasional lower abdominal cramping.  There are other times when she may have no bowel movement for 2 or 3 days and when she does defecate the stool is small and pellet-like.  There is no nausea or vomiting.  Her appetite and weight are stable.  She has a known lactose intolerance.  There is no rectal bleeding or melena.

## 2021-05-26 ENCOUNTER — APPOINTMENT (OUTPATIENT)
Dept: CARDIOLOGY | Facility: CLINIC | Age: 21
End: 2021-05-26
Payer: COMMERCIAL

## 2021-05-26 ENCOUNTER — NON-APPOINTMENT (OUTPATIENT)
Age: 21
End: 2021-05-26

## 2021-05-26 VITALS
TEMPERATURE: 97.9 F | WEIGHT: 150 LBS | DIASTOLIC BLOOD PRESSURE: 70 MMHG | BODY MASS INDEX: 22.22 KG/M2 | OXYGEN SATURATION: 100 % | HEIGHT: 69 IN | HEART RATE: 100 BPM | SYSTOLIC BLOOD PRESSURE: 100 MMHG

## 2021-05-26 DIAGNOSIS — R00.0 TACHYCARDIA, UNSPECIFIED: ICD-10-CM

## 2021-05-26 DIAGNOSIS — R00.2 PALPITATIONS: ICD-10-CM

## 2021-05-26 PROCEDURE — 99214 OFFICE O/P EST MOD 30 MIN: CPT | Mod: 25

## 2021-05-26 PROCEDURE — 93000 ELECTROCARDIOGRAM COMPLETE: CPT

## 2021-05-26 RX ORDER — LORATADINE 5 MG/5 ML
SOLUTION, ORAL ORAL
Refills: 0 | Status: DISCONTINUED | COMMUNITY
End: 2021-05-26

## 2021-06-22 ENCOUNTER — APPOINTMENT (OUTPATIENT)
Dept: FAMILY MEDICINE | Facility: CLINIC | Age: 21
End: 2021-06-22
Payer: COMMERCIAL

## 2021-06-22 VITALS
TEMPERATURE: 98 F | HEART RATE: 87 BPM | OXYGEN SATURATION: 98 % | RESPIRATION RATE: 12 BRPM | BODY MASS INDEX: 23.11 KG/M2 | SYSTOLIC BLOOD PRESSURE: 110 MMHG | HEIGHT: 69 IN | DIASTOLIC BLOOD PRESSURE: 70 MMHG | WEIGHT: 156 LBS

## 2021-06-22 DIAGNOSIS — Z11.1 ENCOUNTER FOR SCREENING FOR RESPIRATORY TUBERCULOSIS: ICD-10-CM

## 2021-06-22 DIAGNOSIS — Z23 ENCOUNTER FOR IMMUNIZATION: ICD-10-CM

## 2021-06-22 PROCEDURE — 86580 TB INTRADERMAL TEST: CPT

## 2021-06-22 NOTE — HISTORY OF PRESENT ILLNESS
[FreeTextEntry1] : Patient here for PPD placement for job [de-identified] : Patient found crying, saying that she is scared of needles.  She was offered to have the blood test done but she declined.

## 2021-06-22 NOTE — PLAN
[FreeTextEntry1] : PPD placed in left forearm by RN, she tolerated well.  She verbalized understanding of having to RTC for  reading of the PPD by Thursday or Friday the latest.  KAROLINA, RN

## 2021-06-22 NOTE — ASSESSMENT
[FreeTextEntry1] : Pt in need of TB testing for work. \par PPD will be administered today and pt will need to RTO in 48-72 h for reading.

## 2021-06-25 DIAGNOSIS — Z00.00 ENCOUNTER FOR GENERAL ADULT MEDICAL EXAMINATION W/OUT ABNORMAL FINDINGS: ICD-10-CM

## 2021-06-30 ENCOUNTER — LABORATORY RESULT (OUTPATIENT)
Age: 21
End: 2021-06-30

## 2021-07-01 ENCOUNTER — APPOINTMENT (OUTPATIENT)
Dept: FAMILY MEDICINE | Facility: CLINIC | Age: 21
End: 2021-07-01

## 2021-07-01 LAB
25(OH)D3 SERPL-MCNC: 36.8 NG/ML
ALBUMIN SERPL ELPH-MCNC: 4.5 G/DL
ALP BLD-CCNC: 84 U/L
ALT SERPL-CCNC: 32 U/L
ANION GAP SERPL CALC-SCNC: 13 MMOL/L
AST SERPL-CCNC: 26 U/L
BILIRUB SERPL-MCNC: 0.7 MG/DL
BUN SERPL-MCNC: 12 MG/DL
CALCIUM SERPL-MCNC: 9.9 MG/DL
CHLORIDE SERPL-SCNC: 104 MMOL/L
CHOLEST SERPL-MCNC: 172 MG/DL
CO2 SERPL-SCNC: 21 MMOL/L
COVID-19 NUCLEOCAPSID  GAM ANTIBODY INTERPRETATION: NEGATIVE
COVID-19 SPIKE DOMAIN ANTIBODY INTERPRETATION: POSITIVE
CREAT SERPL-MCNC: 0.82 MG/DL
GLUCOSE SERPL-MCNC: 73 MG/DL
HDLC SERPL-MCNC: 77 MG/DL
LDLC SERPL CALC-MCNC: 83 MG/DL
NONHDLC SERPL-MCNC: 94 MG/DL
POTASSIUM SERPL-SCNC: 4.1 MMOL/L
PROT SERPL-MCNC: 7.2 G/DL
SARS-COV-2 AB SERPL IA-ACNC: 30 U/ML
SARS-COV-2 AB SERPL QL IA: 0.09 INDEX
SODIUM SERPL-SCNC: 138 MMOL/L
TRIGL SERPL-MCNC: 58 MG/DL
TSH SERPL-ACNC: 0.79 UIU/ML

## 2021-07-02 ENCOUNTER — APPOINTMENT (OUTPATIENT)
Dept: GASTROENTEROLOGY | Facility: CLINIC | Age: 21
End: 2021-07-02

## 2021-07-02 LAB
APPEARANCE: CLEAR
BASOPHILS # BLD AUTO: 0.05 K/UL
BASOPHILS NFR BLD AUTO: 0.5 %
BILIRUBIN URINE: NEGATIVE
BLOOD URINE: ABNORMAL
COLOR: YELLOW
EOSINOPHIL # BLD AUTO: 0.16 K/UL
EOSINOPHIL NFR BLD AUTO: 1.7 %
ESTIMATED AVERAGE GLUCOSE: 100 MG/DL
GLUCOSE QUALITATIVE U: NEGATIVE
HBA1C MFR BLD HPLC: 5.1 %
HCT VFR BLD CALC: 40.3 %
HGB BLD-MCNC: 12.6 G/DL
IMM GRANULOCYTES NFR BLD AUTO: 0.4 %
KETONES URINE: NEGATIVE
LEUKOCYTE ESTERASE URINE: NEGATIVE
LYMPHOCYTES # BLD AUTO: 1.94 K/UL
LYMPHOCYTES NFR BLD AUTO: 20.6 %
MAN DIFF?: NORMAL
MCHC RBC-ENTMCNC: 27.7 PG
MCHC RBC-ENTMCNC: 31.3 GM/DL
MCV RBC AUTO: 88.6 FL
MONOCYTES # BLD AUTO: 0.7 K/UL
MONOCYTES NFR BLD AUTO: 7.4 %
NEUTROPHILS # BLD AUTO: 6.54 K/UL
NEUTROPHILS NFR BLD AUTO: 69.4 %
NITRITE URINE: NEGATIVE
PH URINE: 6.5
PLATELET # BLD AUTO: 420 K/UL
PROTEIN URINE: NORMAL
RBC # BLD: 4.55 M/UL
RBC # FLD: 13.4 %
SPECIFIC GRAVITY URINE: 1.03
UROBILINOGEN URINE: NORMAL
WBC # FLD AUTO: 9.43 K/UL

## 2021-07-06 ENCOUNTER — APPOINTMENT (OUTPATIENT)
Dept: CARDIOLOGY | Facility: CLINIC | Age: 21
End: 2021-07-06

## 2021-07-06 ENCOUNTER — TRANSCRIPTION ENCOUNTER (OUTPATIENT)
Age: 21
End: 2021-07-06

## 2021-07-06 RX ORDER — ARIPIPRAZOLE 5 MG/1
5 TABLET ORAL DAILY
Refills: 0 | Status: ACTIVE | COMMUNITY

## 2021-07-06 RX ORDER — ALPRAZOLAM 0.5 MG/1
0.5 TABLET ORAL TWICE DAILY
Refills: 0 | Status: ACTIVE | COMMUNITY

## 2021-07-06 RX ORDER — BUPROPION HYDROCHLORIDE 300 MG/1
300 TABLET, EXTENDED RELEASE ORAL DAILY
Refills: 0 | Status: ACTIVE | COMMUNITY

## 2022-05-21 ENCOUNTER — EMERGENCY (EMERGENCY)
Facility: HOSPITAL | Age: 22
LOS: 1 days | Discharge: HOME | End: 2022-05-21
Attending: EMERGENCY MEDICINE
Payer: COMMERCIAL

## 2022-05-21 VITALS
HEART RATE: 71 BPM | RESPIRATION RATE: 18 BRPM | SYSTOLIC BLOOD PRESSURE: 124 MMHG | TEMPERATURE: 98 F | OXYGEN SATURATION: 100 % | DIASTOLIC BLOOD PRESSURE: 76 MMHG

## 2022-05-21 VITALS
WEIGHT: 130.07 LBS | HEART RATE: 106 BPM | RESPIRATION RATE: 18 BRPM | OXYGEN SATURATION: 98 % | SYSTOLIC BLOOD PRESSURE: 123 MMHG | DIASTOLIC BLOOD PRESSURE: 70 MMHG | TEMPERATURE: 99 F | HEIGHT: 69 IN

## 2022-05-21 DIAGNOSIS — Z98.890 OTHER SPECIFIED POSTPROCEDURAL STATES: Chronic | ICD-10-CM

## 2022-05-21 DIAGNOSIS — F43.22 ADJUSTMENT DISORDER WITH ANXIETY: ICD-10-CM

## 2022-05-21 DIAGNOSIS — F12.10 CANNABIS ABUSE, UNCOMPLICATED: ICD-10-CM

## 2022-05-21 LAB
ALBUMIN SERPL ELPH-MCNC: 5.2 G/DL — SIGNIFICANT CHANGE UP (ref 3.3–5.2)
ALP SERPL-CCNC: 84 U/L — SIGNIFICANT CHANGE UP (ref 40–120)
ALT FLD-CCNC: 16 U/L — SIGNIFICANT CHANGE UP
ANION GAP SERPL CALC-SCNC: 17 MMOL/L — SIGNIFICANT CHANGE UP (ref 5–17)
APAP SERPL-MCNC: <3 UG/ML — LOW (ref 10–26)
APTT BLD: 31 SEC — SIGNIFICANT CHANGE UP (ref 27.5–35.5)
AST SERPL-CCNC: 21 U/L — SIGNIFICANT CHANGE UP
BASOPHILS # BLD AUTO: 0.05 K/UL — SIGNIFICANT CHANGE UP (ref 0–0.2)
BASOPHILS NFR BLD AUTO: 0.6 % — SIGNIFICANT CHANGE UP (ref 0–2)
BILIRUB SERPL-MCNC: 1 MG/DL — SIGNIFICANT CHANGE UP (ref 0.4–2)
BUN SERPL-MCNC: 12.5 MG/DL — SIGNIFICANT CHANGE UP (ref 8–20)
CALCIUM SERPL-MCNC: 9.8 MG/DL — SIGNIFICANT CHANGE UP (ref 8.6–10.2)
CHLORIDE SERPL-SCNC: 98 MMOL/L — SIGNIFICANT CHANGE UP (ref 98–107)
CO2 SERPL-SCNC: 21 MMOL/L — LOW (ref 22–29)
CREAT SERPL-MCNC: 0.53 MG/DL — SIGNIFICANT CHANGE UP (ref 0.5–1.3)
EGFR: 135 ML/MIN/1.73M2 — SIGNIFICANT CHANGE UP
EOSINOPHIL # BLD AUTO: 0.08 K/UL — SIGNIFICANT CHANGE UP (ref 0–0.5)
EOSINOPHIL NFR BLD AUTO: 0.9 % — SIGNIFICANT CHANGE UP (ref 0–6)
ETHANOL SERPL-MCNC: <10 MG/DL — SIGNIFICANT CHANGE UP (ref 0–9)
GLUCOSE SERPL-MCNC: 79 MG/DL — SIGNIFICANT CHANGE UP (ref 70–99)
HCG SERPL-ACNC: <4 MIU/ML — SIGNIFICANT CHANGE UP
HCT VFR BLD CALC: 41.1 % — SIGNIFICANT CHANGE UP (ref 34.5–45)
HGB BLD-MCNC: 13.6 G/DL — SIGNIFICANT CHANGE UP (ref 11.5–15.5)
IMM GRANULOCYTES NFR BLD AUTO: 0.2 % — SIGNIFICANT CHANGE UP (ref 0–1.5)
INR BLD: 1.03 RATIO — SIGNIFICANT CHANGE UP (ref 0.88–1.16)
LYMPHOCYTES # BLD AUTO: 2.26 K/UL — SIGNIFICANT CHANGE UP (ref 1–3.3)
LYMPHOCYTES # BLD AUTO: 25.7 % — SIGNIFICANT CHANGE UP (ref 13–44)
MCHC RBC-ENTMCNC: 28.4 PG — SIGNIFICANT CHANGE UP (ref 27–34)
MCHC RBC-ENTMCNC: 33.1 GM/DL — SIGNIFICANT CHANGE UP (ref 32–36)
MCV RBC AUTO: 85.8 FL — SIGNIFICANT CHANGE UP (ref 80–100)
MONOCYTES # BLD AUTO: 0.66 K/UL — SIGNIFICANT CHANGE UP (ref 0–0.9)
MONOCYTES NFR BLD AUTO: 7.5 % — SIGNIFICANT CHANGE UP (ref 2–14)
NEUTROPHILS # BLD AUTO: 5.74 K/UL — SIGNIFICANT CHANGE UP (ref 1.8–7.4)
NEUTROPHILS NFR BLD AUTO: 65.1 % — SIGNIFICANT CHANGE UP (ref 43–77)
PLATELET # BLD AUTO: 314 K/UL — SIGNIFICANT CHANGE UP (ref 150–400)
POTASSIUM SERPL-MCNC: 3.9 MMOL/L — SIGNIFICANT CHANGE UP (ref 3.5–5.3)
POTASSIUM SERPL-SCNC: 3.9 MMOL/L — SIGNIFICANT CHANGE UP (ref 3.5–5.3)
PROT SERPL-MCNC: 8.3 G/DL — SIGNIFICANT CHANGE UP (ref 6.6–8.7)
PROTHROM AB SERPL-ACNC: 11.9 SEC — SIGNIFICANT CHANGE UP (ref 10.5–13.4)
RBC # BLD: 4.79 M/UL — SIGNIFICANT CHANGE UP (ref 3.8–5.2)
RBC # FLD: 13.5 % — SIGNIFICANT CHANGE UP (ref 10.3–14.5)
SALICYLATES SERPL-MCNC: <0.6 MG/DL — LOW (ref 10–20)
SODIUM SERPL-SCNC: 136 MMOL/L — SIGNIFICANT CHANGE UP (ref 135–145)
T4 AB SER-ACNC: 4.5 UG/DL — SIGNIFICANT CHANGE UP (ref 4.5–12)
TSH SERPL-MCNC: 0.94 UIU/ML — SIGNIFICANT CHANGE UP (ref 0.27–4.2)
WBC # BLD: 8.81 K/UL — SIGNIFICANT CHANGE UP (ref 3.8–10.5)
WBC # FLD AUTO: 8.81 K/UL — SIGNIFICANT CHANGE UP (ref 3.8–10.5)

## 2022-05-21 PROCEDURE — 84702 CHORIONIC GONADOTROPIN TEST: CPT

## 2022-05-21 PROCEDURE — 80307 DRUG TEST PRSMV CHEM ANLYZR: CPT

## 2022-05-21 PROCEDURE — 84443 ASSAY THYROID STIM HORMONE: CPT

## 2022-05-21 PROCEDURE — 99283 EMERGENCY DEPT VISIT LOW MDM: CPT

## 2022-05-21 PROCEDURE — 99285 EMERGENCY DEPT VISIT HI MDM: CPT

## 2022-05-21 PROCEDURE — 85025 COMPLETE CBC W/AUTO DIFF WBC: CPT

## 2022-05-21 PROCEDURE — 93005 ELECTROCARDIOGRAM TRACING: CPT

## 2022-05-21 PROCEDURE — 85730 THROMBOPLASTIN TIME PARTIAL: CPT

## 2022-05-21 PROCEDURE — 85610 PROTHROMBIN TIME: CPT

## 2022-05-21 PROCEDURE — 84436 ASSAY OF TOTAL THYROXINE: CPT

## 2022-05-21 PROCEDURE — 93010 ELECTROCARDIOGRAM REPORT: CPT

## 2022-05-21 PROCEDURE — 36415 COLL VENOUS BLD VENIPUNCTURE: CPT

## 2022-05-21 PROCEDURE — 80053 COMPREHEN METABOLIC PANEL: CPT

## 2022-05-21 NOTE — ED BEHAVIORAL HEALTH ASSESSMENT NOTE - DETAILS
Extensive safety planning performed with patient. In addition to extensive discussion of safe places patient can go to, distraction techniques, coping skills and who patient can call in the event of crisis including various hotlines. Patient  agreeing verbally to return patient to ER or call 911 if symptoms worsen or patient has urges to harm self or others. self referred found mother after she  see hpi

## 2022-05-21 NOTE — ED BEHAVIORAL HEALTH ASSESSMENT NOTE - SUMMARY
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    patient presents requesting psychiatric evaluation and endorsing passive suicidal ideation.  Patient denies active SI/p/i.  She is in outpatient treatment, she declines voluntary admission.  She endorses periods of emotional lability towards family and at times, friends.  She does function, attends work (never misses a day) and is future oriented with protective factors.  Discussed DBT treatment and gave information for linkage to [facility].  S/w outpatient NP, no acute safety concerns, will explore [facility] if patient doesn't follow up with DBT.

## 2022-05-21 NOTE — ED BEHAVIORAL HEALTH ASSESSMENT NOTE - HPI (INCLUDE ILLNESS QUALITY, SEVERITY, DURATION, TIMING, CONTEXT, MODIFYING FACTORS, ASSOCIATED SIGNS AND SYMPTOMS)
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    On current evaluation, patient is tearful, states "I want to find out what's wrong with me".  Reports that she is "out of control at times", gets angry and throws things, breaks things and yells.  She mostly does this with her father, sister and friends. 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  Reports she gets "overwhelmed at times",  has passive suicidal ideation, states "If I didn't wake that that's ok", but denies plan or intent.  States "I don't want to kill myself, I just want to get better.  I want to be able to control my anger".  States that in 2019, her mother passed away (had lupus and in poor health) and she feels guilty about how she treated her mother before her death.  States she found her mother and right after had flashbacks/nightmares to seeing her mothers face.  Denies such symptoms at present.  Reports adequate sleep, energy and appetite.  reports smoking marijuana daily, "to relax".  She has a history of inadequate medication trials, reports longest length of time on med 3-4 weeks but usually 1 or 2 weeks, then either she tops them or her NP switches it due to her complaints of it not working.  Reports tumultuous relationship with dad as she blames him for not being nice to her mother prior to her death.  She does enjoy work and spending time with her friends.  She has goals of returning to college (stopped during the pandemic because she didn't like attending online) and would like to become a  or mental health counselor and would like to work with the elderly population, with dementia.  The patient  manic symptoms, past and present.  The patient denies auditory or visual hallucinations, and no delusions could be elicited on direct questioning.  The patient denies suicidal ideation, homicidal ideation, intent, or plan.    Collateral: outpatient [facility]  Reports patient is reactive, smoking marijuana daily, non-compliant with medications and has chronic mood instability with passive suicidal ideation.  No history of suicide attempts or self harm.  She does function, attends work but has tumultuous relationship with dad, who is supportive.  No acute safety concerns.

## 2022-05-21 NOTE — ED ADULT TRIAGE NOTE - CHIEF COMPLAINT QUOTE
C/o suicidal thoughts. Denies plan. Pt states, "I feel like I am having breakdowns". Hx of depression.

## 2022-05-21 NOTE — ED BEHAVIORAL HEALTH ASSESSMENT NOTE - DESCRIPTION
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

## 2022-05-21 NOTE — ED STATDOCS - SHIFT CHANGE DETAILS
Patient signed out to incoming physician.  All decisions regarding the progression of care will be made at their discretion. Patient signed out to incoming physician.  All decisions regarding the progression of care will be made at their discretion.  HL- received in sign out  check labs and dispo as pt is cleared from

## 2022-05-21 NOTE — ED STATDOCS - PHYSICAL EXAMINATION
Const: Awake, alert and oriented. In no acute distress. Well appearing.  HEENT: NC/AT. Moist mucous membranes.  Eyes: No scleral icterus. EOMI.  Neck:. Soft and supple. Full ROM without pain.  Cardiac: Regular rate and regular rhythm. +S1/S2. Peripheral pulses 2+ and symmetric. No LE edema.  Resp: Speaking in full sentences. No evidence of respiratory distress. No wheezes, rales or rhonchi.  Abd: Soft, non-tender, non-distended. Normal bowel sounds in all 4 quadrants. No guarding or rebound.  Back: Spine midline and non-tender. No CVAT.  Skin: (+) LE with areas of what appear to be old ecchymosis, non tender. No rashes, abrasions or lacerations.   Lymph: No cervical lymphadenopathy.  Neuro: Awake, alert & oriented x 3. Moves all extremities symmetrically.

## 2022-05-21 NOTE — ED BEHAVIORAL HEALTH ASSESSMENT NOTE - NSSUICPROTFACT_PSY_ALL_CORE
Responsibility to children, family, or others/Identifies reasons for living/Fear of death or the actual act of killing self/Engaged in work or school/Positive therapeutic relationships

## 2022-05-21 NOTE — ED ADULT NURSE REASSESSMENT NOTE - NS ED NURSE REASSESS COMMENT FT1
pt was evaluated by psych provider and cleared to be discharged. pt awaiting lab work and family to bring her clothing and provide transportation. pt endorses being discharged and contracts for safety.

## 2022-05-21 NOTE — ED BEHAVIORAL HEALTH ASSESSMENT NOTE - OTHER PAST PSYCHIATRIC HISTORY (INCLUDE DETAILS REGARDING ONSET, COURSE OF ILLNESS, INPATIENT/OUTPATIENT TREATMENT)
in outpatient treatment with psychiatric NP, Mare Felix x 3 years  therapy with [facility]  no prior px admissions or suicide attempts

## 2022-05-21 NOTE — ED ADULT NURSE NOTE - NSIMPLEMENTINTERV_GEN_ALL_ED
Implemented All Universal Safety Interventions:  [place] to call system. Call bell, personal items and telephone within reach. Instruct patient to call for assistance. Room bathroom lighting operational. Non-slip footwear when patient is off stretcher. Physically safe environment: no spills, clutter or unnecessary equipment. Stretcher in lowest position, wheels locked, appropriate side rails in place.

## 2022-05-21 NOTE — ED STATDOCS - OBJECTIVE STATEMENT
22 y/o female with PMHx of anxiety presents with suicidal thoughts and severe anxiety. Pt states losing her mother two years ago, and feelings have gotten progressively worse. Pt admits to SI but has not done anything to herself. Pt feels like shes better off not being alive. No HI, but admits to feeling very angry sometimes. Denies hallucinations. No prior psychiatric history. Alcohol yesterday. Denies use of cocaine and other drugs.     Her sister, Sudhir states pt tried to kill herself or has made threats to do so. Friends had to pop her once because she threatened to jump off a bridge. Sudhir states the past month, her anxiety has been much worse. Pt self inflicts which is why she has bruises according to her sister. Pt has not threatened to harm anyone but gets mad. Pt got worse after mothers death, but prior she was still suffering from anxiety.     Pt denies fevers/chills, ha, loc, focal neuro deficits, cp/sob/palp, cough, abd pain/n/v/d, urinary symptoms, recent travel and sick contacts.     [phone] Sudhir's contact.

## 2022-05-21 NOTE — ED STATDOCS - CLINICAL SUMMARY MEDICAL DECISION MAKING FREE TEXT BOX
20 y/o female with PMHx of anxiety presents with suicidal thoughts and severe anxiety. Medical clearance for  evaluation.

## 2022-05-21 NOTE — ED BEHAVIORAL HEALTH ASSESSMENT NOTE - RISK ASSESSMENT
Chronic risk factors: ongoing substance use; psychosocial stressors; single. Protective factors: young; healthy; treatment compliant; no history of hospitalizations, ; no suicide attempts; no self-injurious behavior; no hx of aggression/violence; no legal issues; motivated for help; articulate; strong family support; access to health services. No acute risk factors identified Low Acute Suicide Risk

## 2022-05-21 NOTE — ED BEHAVIORAL HEALTH ASSESSMENT NOTE - SAFETY PLAN ADDT'L DETAILS
Safety plan discussed with.../Education provided regarding environmental safety / lethal means restriction/Provision of National Suicide Prevention Lifeline 0-396-055-JNAX (8696)

## 2022-05-21 NOTE — ED STATDOCS - PATIENT PORTAL LINK FT
You can access the FollowMyHealth Patient Portal offered by [facility] by registering at the following website: [email or web address]. By joining [facility]s FollowMyHealth portal, you will also be able to view your health information using other applications (apps) compatible with our system.

## 2022-05-21 NOTE — ED ADULT NURSE NOTE - HPI (INCLUDE ILLNESS QUALITY, SEVERITY, DURATION, TIMING, CONTEXT, MODIFYING FACTORS, ASSOCIATED SIGNS AND SYMPTOMS)
pt reports feeling distressed and suicidal which she states happens often. pt denies intent to harm herself stating she has never attempted to commit suicide. pt reports being non compliant with medication and self medicates with marijuana. pt has a labile mood and was tearful at times during conversation. pt states that she does not want inpatient tx and is looking for out patient resources.
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
## 2023-01-23 NOTE — ED ADULT NURSE NOTE - AFFECT QUALITY
Depressed Tumor Depth: Less than 6mm from granular layer and no invasion beyond the subcutaneous fat

## 2023-11-03 NOTE — H&P PST ADULT - NS PRO LACT YNNA
Continue with all your current medications. Use Tylenol every 4 hours or Motrin every 6 hours; you can alternate the 2 medications taking something every 3 hours for pain or fever. Take all oral antibiotics until done. Follow-up with your doctor/Dr Moreno. no
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
## 2023-12-13 NOTE — ED BEHAVIORAL HEALTH ASSESSMENT NOTE - NS ED [facility] BILLING NP WITHOUT ATTENDING PSYCHIATRIST
[address] [facility] [employer]    Physical Therapy Treatment Note/ Progress Report:     Date:  2023    Patient Name:  Hugh eMrida    :  1985  MRN: [id number]    Physician Information:  Riki Kumar [address] [place] [address] / [id number]

## 2024-04-05 NOTE — H&P PST ADULT - NS PRO FEM PAP NOT DONE 3 YRS
If you are a smoker, it is important for your health to stop smoking. Please be aware that second hand smoke is also harmful. pap smear contraindicated

## 2024-04-22 NOTE — ED BEHAVIORAL HEALTH ASSESSMENT NOTE - ACCESS TO FIREARM
NEPHROLOGY PROGRESS NOTE------[facility]    [facility]  [phone]  Hany Rolon MD      Patient Care Team:  Jackson Pop MD as PCP - Abraham Tom MD as Consulting Physician (Hematology and Oncology)  Jordyn Simmons MD as Consulting Physician (Nephrology)      Provider:  Hany Rolon MD  Patient Name: Chirag Parikh  :  [id number]    XR Chest 1 View [[id number]] [id number]    XR Chest 1 View [[id number]] [id number]    XR Chest 1 View [[id number]] Hany Rolon MD  [facility]  [phone]  04/22/24  08:40 EDT     No

## 2024-08-01 PROBLEM — F32.9 MAJOR DEPRESSIVE DISORDER, SINGLE EPISODE, UNSPECIFIED: Chronic | Status: ACTIVE | Noted: 2022-05-21

## 2024-08-07 ENCOUNTER — APPOINTMENT (OUTPATIENT)
Dept: OBGYN | Facility: CLINIC | Age: 24
End: 2024-08-07

## 2024-08-07 PROBLEM — T74.22XA VICTIM OF SEXUAL ABUSE IN CHILDHOOD: Status: ACTIVE | Noted: 2024-08-07

## 2024-08-07 PROBLEM — R10.2 FEMALE PELVIC PAIN: Status: ACTIVE | Noted: 2024-08-07

## 2024-08-07 PROCEDURE — 99459 PELVIC EXAMINATION: CPT

## 2024-08-07 PROCEDURE — 99203 OFFICE O/P NEW LOW 30 MIN: CPT

## 2024-08-07 NOTE — PLAN
[FreeTextEntry1] : Female pelvic pain   - US ordered  - Will review results and have general examination at that time.   F/U in 2 weeks  All questions and concerns addressed during encounter. Pt. agreed to plan of care.
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
## 2024-08-07 NOTE — PHYSICAL EXAM
[Chaperone Present] : A chaperone was present in the examining room during all aspects of the physical examination [[id number]] : A chaperone was present during the pelvic exam. [Appropriately responsive] : appropriately responsive [Alert] : alert [No Acute Distress] : no acute distress [Soft] : soft [Non-tender] : non-tender [Non-distended] : non-distended [No HSM] : No HSM [No Lesions] : no lesions [No Mass] : no mass [Oriented x3] : oriented x3

## 2024-08-13 ENCOUNTER — APPOINTMENT (OUTPATIENT)
Dept: ULTRASOUND IMAGING | Facility: CLINIC | Age: 24
End: 2024-08-13

## 2024-08-13 PROCEDURE — 76830 TRANSVAGINAL US NON-OB: CPT | Mod: 26

## 2024-08-13 PROCEDURE — 76856 US EXAM PELVIC COMPLETE: CPT | Mod: 26,59

## 2024-08-21 ENCOUNTER — LABORATORY RESULT (OUTPATIENT)
Age: 24
End: 2024-08-21

## 2024-08-21 ENCOUNTER — APPOINTMENT (OUTPATIENT)
Dept: OBGYN | Facility: CLINIC | Age: 24
End: 2024-08-21
Payer: COMMERCIAL

## 2024-08-21 VITALS
BODY MASS INDEX: 20.73 KG/M2 | HEIGHT: 69 IN | WEIGHT: 140 LBS | DIASTOLIC BLOOD PRESSURE: 70 MMHG | SYSTOLIC BLOOD PRESSURE: 110 MMHG

## 2024-08-21 DIAGNOSIS — Z01.411 ENCOUNTER FOR GYNECOLOGICAL EXAMINATION (GENERAL) (ROUTINE) WITH ABNORMAL FINDINGS: ICD-10-CM

## 2024-08-21 DIAGNOSIS — Z71.2 PERSON CONSULTING FOR EXPLANATION OF EXAMINATION OR TEST FINDINGS: ICD-10-CM

## 2024-08-21 PROCEDURE — 99459 PELVIC EXAMINATION: CPT

## 2024-08-21 PROCEDURE — 99395 PREV VISIT EST AGE 18-39: CPT

## 2024-08-21 NOTE — PLAN
[FreeTextEntry1] : Encounter for GYN exam   - PAP OBTAINED   Discuss results   - Results reassuring. Pt. instructed when to seek care   All questions and concerns addressed during encounter. Pt. agreed to plan of care.  F/U in 1 year

## 2024-08-21 NOTE — HISTORY OF PRESENT ILLNESS
[FreeTextEntry1] : 24 year old female presents for annual examination. Pt. recently had pelvic US performed for some pelvic pain and irregular bleeding. Pt. states she just had menstrual cycle which she reports as normal she has had no other episodes of pelvic pain. No further complaints or concerns today.

## 2024-08-21 NOTE — PHYSICAL EXAM
[Chaperone Present] : A chaperone was present in the examining room during all aspects of the physical examination [[id number]] : A chaperone was present during the pelvic exam. [Appropriately responsive] : appropriately responsive [Alert] : alert [No Acute Distress] : no acute distress [Soft] : soft [Non-tender] : non-tender [Non-distended] : non-distended [No HSM] : No HSM [No Lesions] : no lesions [No Mass] : no mass [Oriented x3] : oriented x3 [Examination Of The Breasts] : a normal appearance [No Masses] : no breast masses were palpable [Labia Majora] : normal [Labia Minora] : normal [Normal] : normal [Uterine Adnexae] : normal

## 2024-08-21 NOTE — REASON FOR VISIT
[Annual] : an annual visit. [FreeTextEntry2] : The patient also desires discussing sonogram results.

## 2024-08-26 LAB — HPV HIGH+LOW RISK DNA PNL CVX: DETECTED

## 2024-08-27 LAB — CYTOLOGY CVX/VAG DOC THIN PREP: ABNORMAL

## 2024-10-22 ENCOUNTER — APPOINTMENT (OUTPATIENT)
Dept: FAMILY MEDICINE | Facility: CLINIC | Age: 24
End: 2024-10-22
Payer: COMMERCIAL

## 2024-10-22 VITALS
HEIGHT: 69 IN | SYSTOLIC BLOOD PRESSURE: 132 MMHG | WEIGHT: 136 LBS | TEMPERATURE: 98.3 F | RESPIRATION RATE: 12 BRPM | OXYGEN SATURATION: 99 % | DIASTOLIC BLOOD PRESSURE: 68 MMHG | BODY MASS INDEX: 20.14 KG/M2 | HEART RATE: 72 BPM

## 2024-10-22 DIAGNOSIS — Z76.89 PERSONS ENCOUNTERING HEALTH SERVICES IN OTHER SPECIFIED CIRCUMSTANCES: ICD-10-CM

## 2024-10-22 DIAGNOSIS — Z00.00 ENCOUNTER FOR GENERAL ADULT MEDICAL EXAMINATION W/OUT ABNORMAL FINDINGS: ICD-10-CM

## 2024-10-22 PROCEDURE — 36415 COLL VENOUS BLD VENIPUNCTURE: CPT

## 2024-10-22 PROCEDURE — G0444 DEPRESSION SCREEN ANNUAL: CPT | Mod: 59

## 2024-10-22 PROCEDURE — 99395 PREV VISIT EST AGE 18-39: CPT

## 2024-10-22 RX ORDER — AMOXICILLIN AND CLAVULANATE POTASSIUM 500; 125 MG/1; MG/1
500-125 TABLET, FILM COATED ORAL
Qty: 14 | Refills: 0 | Status: ACTIVE | COMMUNITY
Start: 2024-10-22 | End: 1900-01-01

## 2024-10-22 RX ORDER — MUPIROCIN 20 MG/G
2 OINTMENT TOPICAL 3 TIMES DAILY
Qty: 1 | Refills: 0 | Status: ACTIVE | COMMUNITY
Start: 2024-10-22 | End: 1900-01-01

## 2024-10-24 ENCOUNTER — TRANSCRIPTION ENCOUNTER (OUTPATIENT)
Age: 24
End: 2024-10-24

## 2024-10-24 LAB
ALBUMIN SERPL ELPH-MCNC: 4.8 G/DL
ALP BLD-CCNC: 66 U/L
ALT SERPL-CCNC: 9 U/L
ANION GAP SERPL CALC-SCNC: 13 MMOL/L
APPEARANCE: CLEAR
AST SERPL-CCNC: 15 U/L
BACTERIA: NEGATIVE /HPF
BILIRUB SERPL-MCNC: 0.6 MG/DL
BILIRUBIN URINE: NEGATIVE
BLOOD URINE: NEGATIVE
BUN SERPL-MCNC: 9 MG/DL
CALCIUM SERPL-MCNC: 9.7 MG/DL
CAST: 0 /LPF
CHLORIDE SERPL-SCNC: 105 MMOL/L
CHOLEST SERPL-MCNC: 160 MG/DL
CO2 SERPL-SCNC: 21 MMOL/L
COLOR: YELLOW
CREAT SERPL-MCNC: 0.64 MG/DL
EGFR: 126 ML/MIN/1.73M2
EPITHELIAL CELLS: 1 /HPF
ESTIMATED AVERAGE GLUCOSE: 100 MG/DL
GLUCOSE QUALITATIVE U: NEGATIVE MG/DL
GLUCOSE SERPL-MCNC: 89 MG/DL
HBA1C MFR BLD HPLC: 5.1 %
HCT VFR BLD CALC: 41.5 %
HDLC SERPL-MCNC: 68 MG/DL
HGB BLD-MCNC: 13.3 G/DL
KETONES URINE: NEGATIVE MG/DL
LDLC SERPL CALC-MCNC: 84 MG/DL
LEUKOCYTE ESTERASE URINE: NEGATIVE
MCHC RBC-ENTMCNC: 29.2 PG
MCHC RBC-ENTMCNC: 32 GM/DL
MCV RBC AUTO: 91.2 FL
MICROSCOPIC-UA: NORMAL
NITRITE URINE: NEGATIVE
NONHDLC SERPL-MCNC: 92 MG/DL
PH URINE: 6.5
PLATELET # BLD AUTO: 315 K/UL
POTASSIUM SERPL-SCNC: 4.2 MMOL/L
PROT SERPL-MCNC: 7.3 G/DL
PROTEIN URINE: NEGATIVE MG/DL
RBC # BLD: 4.55 M/UL
RBC # FLD: 12.8 %
RED BLOOD CELLS URINE: 1 /HPF
SODIUM SERPL-SCNC: 139 MMOL/L
SPECIFIC GRAVITY URINE: 1.02
TRIGL SERPL-MCNC: 33 MG/DL
TSH SERPL-ACNC: 0.61 UIU/ML
UROBILINOGEN URINE: 0.2 MG/DL
WBC # FLD AUTO: 3.92 K/UL
WHITE BLOOD CELLS URINE: 0 /HPF

## 2024-10-25 ENCOUNTER — TRANSCRIPTION ENCOUNTER (OUTPATIENT)
Age: 24
End: 2024-10-25

## 2024-10-28 ENCOUNTER — TRANSCRIPTION ENCOUNTER (OUTPATIENT)
Age: 24
End: 2024-10-28

## 2024-12-03 ENCOUNTER — APPOINTMENT (OUTPATIENT)
Dept: FAMILY MEDICINE | Facility: CLINIC | Age: 24
End: 2024-12-03
Payer: COMMERCIAL

## 2024-12-03 VITALS
HEART RATE: 87 BPM | RESPIRATION RATE: 12 BRPM | HEIGHT: 66 IN | OXYGEN SATURATION: 98 % | DIASTOLIC BLOOD PRESSURE: 66 MMHG | WEIGHT: 135 LBS | BODY MASS INDEX: 21.69 KG/M2 | SYSTOLIC BLOOD PRESSURE: 124 MMHG

## 2024-12-03 DIAGNOSIS — F41.8 OTHER SPECIFIED ANXIETY DISORDERS: ICD-10-CM

## 2024-12-03 PROCEDURE — 99212 OFFICE O/P EST SF 10 MIN: CPT

## 2025-01-07 ENCOUNTER — APPOINTMENT (OUTPATIENT)
Dept: FAMILY MEDICINE | Facility: CLINIC | Age: 25
End: 2025-01-07
Payer: COMMERCIAL

## 2025-01-07 VITALS
SYSTOLIC BLOOD PRESSURE: 110 MMHG | RESPIRATION RATE: 14 BRPM | BODY MASS INDEX: 21.86 KG/M2 | WEIGHT: 136 LBS | DIASTOLIC BLOOD PRESSURE: 68 MMHG | TEMPERATURE: 98.4 F | HEIGHT: 66 IN

## 2025-01-07 DIAGNOSIS — F41.8 OTHER SPECIFIED ANXIETY DISORDERS: ICD-10-CM

## 2025-01-07 PROCEDURE — 99212 OFFICE O/P EST SF 10 MIN: CPT
